# Patient Record
Sex: MALE | Race: WHITE
[De-identification: names, ages, dates, MRNs, and addresses within clinical notes are randomized per-mention and may not be internally consistent; named-entity substitution may affect disease eponyms.]

---

## 2017-08-09 ENCOUNTER — HOSPITAL ENCOUNTER (OUTPATIENT)
Dept: HOSPITAL 53 - M LAB | Age: 22
End: 2017-08-09
Attending: PHYSICIAN ASSISTANT
Payer: COMMERCIAL

## 2017-08-09 DIAGNOSIS — R10.9: Primary | ICD-10-CM

## 2017-08-09 LAB
ALBUMIN SERPL BCG-MCNC: 4.6 GM/DL (ref 3.2–5.2)
ALBUMIN/GLOB SERPL: 1.44 {RATIO} (ref 1–1.93)
ALP SERPL-CCNC: 108 U/L (ref 45–117)
ALT SERPL W P-5'-P-CCNC: 188 U/L (ref 12–78)
ANION GAP SERPL CALC-SCNC: 8 MEQ/L (ref 8–16)
AST SERPL-CCNC: 76 U/L (ref 15–37)
BASOPHILS # BLD AUTO: 0.1 K/MM3 (ref 0–0.2)
BASOPHILS NFR BLD AUTO: 0.8 % (ref 0–1)
BILIRUB SERPL-MCNC: 0.8 MG/DL (ref 0.2–1)
BUN SERPL-MCNC: 14 MG/DL (ref 7–18)
CALCIUM SERPL-MCNC: 9.1 MG/DL (ref 8.5–10.1)
CHLORIDE SERPL-SCNC: 107 MEQ/L (ref 98–107)
CO2 SERPL-SCNC: 28 MEQ/L (ref 21–32)
CREAT SERPL-MCNC: 1.1 MG/DL (ref 0.7–1.3)
EOSINOPHIL # BLD AUTO: 0.1 K/MM3 (ref 0–0.5)
EOSINOPHIL NFR BLD AUTO: 1.5 % (ref 0–3)
ERYTHROCYTE [DISTWIDTH] IN BLOOD BY AUTOMATED COUNT: 13.1 % (ref 11.5–14.5)
GFR SERPL CREATININE-BSD FRML MDRD: > 60 ML/MIN/{1.73_M2} (ref 60–?)
GLUCOSE SERPL-MCNC: 79 MG/DL (ref 70–105)
LARGE UNSTAINED CELL #: 0.2 K/MM3 (ref 0–0.4)
LARGE UNSTAINED CELL %: 2.8 % (ref 0–4)
LYMPHOCYTES # BLD AUTO: 2.7 K/MM3 (ref 1.5–6.5)
LYMPHOCYTES NFR BLD AUTO: 30.2 % (ref 24–44)
MCH RBC QN AUTO: 30.5 PG (ref 27–33)
MCHC RBC AUTO-ENTMCNC: 34 G/DL (ref 32–36.5)
MCV RBC AUTO: 89.7 FL (ref 80–96)
MONOCYTES # BLD AUTO: 0.5 K/MM3 (ref 0–0.8)
MONOCYTES NFR BLD AUTO: 6.5 % (ref 0–5)
NEUTROPHILS # BLD AUTO: 4.8 K/MM3 (ref 1.8–7.7)
NEUTROPHILS NFR BLD AUTO: 58.1 % (ref 36–66)
PLATELET # BLD AUTO: 315 K/MM3 (ref 150–450)
POTASSIUM SERPL-SCNC: 4.5 MEQ/L (ref 3.5–5.1)
PROT SERPL-MCNC: 7.8 GM/DL (ref 6.4–8.2)
SODIUM SERPL-SCNC: 143 MEQ/L (ref 136–145)
WBC # BLD AUTO: 8.2 K/MM3 (ref 4–10)

## 2019-01-23 ENCOUNTER — HOSPITAL ENCOUNTER (OUTPATIENT)
Dept: HOSPITAL 53 - M SFHCPLAZ | Age: 24
End: 2019-01-23
Attending: PHYSICIAN ASSISTANT
Payer: COMMERCIAL

## 2019-01-23 DIAGNOSIS — R94.5: Primary | ICD-10-CM

## 2019-01-23 LAB
ALBUMIN SERPL BCG-MCNC: 4.5 GM/DL (ref 3.2–5.2)
ALT SERPL W P-5'-P-CCNC: 231 U/L (ref 12–78)
BILIRUB SERPL-MCNC: 0.7 MG/DL (ref 0.2–1)
BUN SERPL-MCNC: 14 MG/DL (ref 7–18)
CALCIUM SERPL-MCNC: 9.5 MG/DL (ref 8.5–10.1)
CHLORIDE SERPL-SCNC: 106 MEQ/L (ref 98–107)
CO2 SERPL-SCNC: 28 MEQ/L (ref 21–32)
CREAT SERPL-MCNC: 1.02 MG/DL (ref 0.7–1.3)
GFR SERPL CREATININE-BSD FRML MDRD: > 60 ML/MIN/{1.73_M2} (ref 60–?)
GLUCOSE SERPL-MCNC: 87 MG/DL (ref 70–100)
POTASSIUM SERPL-SCNC: 4.8 MEQ/L (ref 3.5–5.1)
PROT SERPL-MCNC: 7.4 GM/DL (ref 6.4–8.2)
SODIUM SERPL-SCNC: 140 MEQ/L (ref 136–145)

## 2019-01-25 LAB
HBV SURFACE AB SER QL: NEGATIVE
HBV SURFACE AB SER-ACNC: NEGATIVE M[IU]/ML
HCV AB SER QL: 0.1 INDEX (ref ?–0.8)

## 2019-01-30 ENCOUNTER — HOSPITAL ENCOUNTER (OUTPATIENT)
Dept: HOSPITAL 53 - M RAD | Age: 24
End: 2019-01-30
Attending: PHYSICIAN ASSISTANT
Payer: COMMERCIAL

## 2019-01-30 DIAGNOSIS — K76.0: ICD-10-CM

## 2019-01-30 DIAGNOSIS — R94.5: Primary | ICD-10-CM

## 2019-01-30 NOTE — REP
Clinical: Elevated liver function tests.

 

Technique:  Gray scale ultrasound using curved array transducer.

 

Findings:  The liver is diffusely echogenic suggesting fatty infiltration with

small areas of focal fatty sparing.  The spleen is upper limits of normal in size

but without focal splenic lesion identified and normal parenchymal echo texture.

Pancreas is normal in appearance.  The gallbladder is normal without gallstones,

wall thickening or pericholecystic fluid.  No biliary ductal dilatation is

appreciated, and the common bile duct measures 5.7 mm diameter.  The bilateral

kidneys are normal in parenchymal echogenicity, size and reniform shape without

hydronephrosis or obvious abnormality.  Right kidney measures 11.7 x 6.2 x 5.1

cm.  Left kidney measures 12.9 x 6.7 x 6.7 cm.  Abdominal aorta appears normal.

No ascites.

 

Impression:

1.  Hepatic steatosis with areas of focal fatty sparing.

2.  Otherwise normal complete abdominal ultrasound

 

 

Electronically Signed by

Josiah Casanova MD 01/30/2019 08:33 A

## 2019-03-28 ENCOUNTER — HOSPITAL ENCOUNTER (OUTPATIENT)
Dept: HOSPITAL 53 - M SFHCPLAZ | Age: 24
End: 2019-03-28
Attending: PHYSICIAN ASSISTANT
Payer: COMMERCIAL

## 2019-03-28 DIAGNOSIS — Z02.1: Primary | ICD-10-CM

## 2019-03-28 PROCEDURE — 36415 COLL VENOUS BLD VENIPUNCTURE: CPT

## 2021-03-24 ENCOUNTER — HOSPITAL ENCOUNTER (OUTPATIENT)
Dept: HOSPITAL 53 - M RAD | Age: 26
End: 2021-03-24
Attending: CHIROPRACTOR
Payer: COMMERCIAL

## 2021-03-24 DIAGNOSIS — M50.33: ICD-10-CM

## 2021-03-24 DIAGNOSIS — M51.26: ICD-10-CM

## 2021-03-24 DIAGNOSIS — M99.01: ICD-10-CM

## 2021-03-24 DIAGNOSIS — M54.42: Primary | ICD-10-CM

## 2021-03-24 NOTE — REPVR
PROCEDURE INFORMATION: 

Exam: MR Cervical Spine Without Contrast 

Exam date and time: 3/24/2021 8:14 AM 

Age: 26 years old 

Clinical indication: Neck pain; Patient HX: Lumbago with sciatica, disfunction 

of cervical reg 



TECHNIQUE: 

Imaging protocol: Multiplanar magnetic resonance images of the cervical spine 

without contrast. 



COMPARISON: 

No relevant prior studies available. 



FINDINGS: 

Vertebrae: Unremarkable. 

Spinal cord: Normal signal. No cord compression. 

C2-C3: No significant disc disease. No significant spinal stenosis. 

C3-C4: There is mild disc bulging. 

C4-C5: No significant disc disease. No significant spinal stenosis. 

C5-C6: No significant disc disease. No significant spinal stenosis. 

C6-C7: There is mild disc bulging. 

C7-T1: No significant disc disease. No significant spinal stenosis. 

Soft tissues: Unremarkable. 





IMPRESSION: 

Multilevel degenerative changes with variable degrees of spinal canal and 

neuroforaminal narrowing. 



Electronically signed by: Edwin Sandhu On 03/24/2021  08:37:06 AM

## 2021-03-24 NOTE — REPVR
PROCEDURE INFORMATION: 

Exam: MR Lumbar Spine Without Contrast. 

Exam date and time: 3/24/2021 8:14 AM 

Age: 26 years old 

Clinical indication: Lumbago with sciatica. 



TECHNIQUE: 

Imaging protocol: Multiplanar magnetic resonance images of the lumbar spine 

without contrast. 



COMPARISON: 

No relevant prior studies available. 



FINDINGS: 

Vertebrae: Unremarkable. 

Spinal cord: Normal signal. No cord compression. 

L1-L2:  No significant disc disease. No significant spinal canal stenosis. No 

neural foraminal stenosis. 

L2-L3:  No significant disc disease. No significant spinal canal stenosis. No 

neural foraminal stenosis. 

L3-L4:  No significant disc disease. No significant spinal canal stenosis. No 

neural foraminal stenosis. 

L4-L5: There is mild retrolisthesis at this level. There is disc space 

narrowing and desiccation. There are moderate degenerative end plate changes at 

this level. There is a large broad-based left subarticular/foraminal disc 

protrusion with a large extruded disc fragment located posterior to the L4 

vertebra, primarily right-sided. The thecal sac is displaced posteriorly and 

toward the left. The extruded disc fragment displaces the exiting right L4 and 

descending right L5 nerve roots. There is severe spinal canal stenosis. There 

is facet arthropathy and ligamentum flavum hypertrophy. There is moderate 

bilateral neural foraminal narrowing. 

L5-S1:  No significant disc disease. No significant spinal canal stenosis. No 

neural foraminal stenosis. 

Soft tissues: Unremarkable. 



IMPRESSION: 

Large disc herniation at L4/5 with a large extruded disc fragment causing 

severe spinal canal stenosis and nerve root compression. Impression. 

Neurosurgical consultation is recommended. 



Electronically signed by: Edwin Sandhu On 03/24/2021  08:33:54 AM

## 2025-07-08 ENCOUNTER — HOSPITAL ENCOUNTER (OUTPATIENT)
Dept: HOSPITAL 53 - M WUC | Age: 30
End: 2025-07-08
Attending: PHYSICIAN ASSISTANT
Payer: COMMERCIAL

## 2025-07-08 DIAGNOSIS — M25.571: Primary | ICD-10-CM
